# Patient Record
Sex: MALE | Race: WHITE | NOT HISPANIC OR LATINO | URBAN - METROPOLITAN AREA
[De-identification: names, ages, dates, MRNs, and addresses within clinical notes are randomized per-mention and may not be internally consistent; named-entity substitution may affect disease eponyms.]

---

## 2019-02-16 ENCOUNTER — EMERGENCY (EMERGENCY)
Facility: HOSPITAL | Age: 24
LOS: 0 days | Discharge: HOME | End: 2019-02-16
Attending: EMERGENCY MEDICINE | Admitting: EMERGENCY MEDICINE
Payer: COMMERCIAL

## 2019-02-16 VITALS — HEIGHT: 72 IN | WEIGHT: 166.89 LBS

## 2019-02-16 VITALS
SYSTOLIC BLOOD PRESSURE: 129 MMHG | RESPIRATION RATE: 16 BRPM | DIASTOLIC BLOOD PRESSURE: 74 MMHG | OXYGEN SATURATION: 100 % | HEART RATE: 63 BPM | TEMPERATURE: 99 F
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
## 2019-02-16 DIAGNOSIS — S43.402A UNSPECIFIED SPRAIN OF LEFT SHOULDER JOINT, INITIAL ENCOUNTER: ICD-10-CM
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
## 2019-02-16 DIAGNOSIS — S09.90XA UNSPECIFIED INJURY OF HEAD, INITIAL ENCOUNTER: ICD-10-CM
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
## 2019-02-16 DIAGNOSIS — S06.0X0A CONCUSSION WITHOUT LOSS OF CONSCIOUSNESS, INITIAL ENCOUNTER: ICD-10-CM

## 2019-02-16 DIAGNOSIS — S00.81XA ABRASION OF OTHER PART OF HEAD, INITIAL ENCOUNTER: ICD-10-CM

## 2019-02-16 PROCEDURE — 70450 CT HEAD/BRAIN W/O DYE: CPT | Mod: 26

## 2019-02-16 PROCEDURE — 99284 EMERGENCY DEPT VISIT MOD MDM: CPT

## 2019-02-16 PROCEDURE — 72125 CT NECK SPINE W/O DYE: CPT | Mod: 26

## 2019-02-16 PROCEDURE — 73030 X-RAY EXAM OF SHOULDER: CPT | Mod: 26,LT

## 2019-02-16 RX ORDER — TETANUS TOXOID, REDUCED DIPHTHERIA TOXOID AND ACELLULAR PERTUSSIS VACCINE, ADSORBED 5; 2.5; 8; 8; 2.5 [IU]/.5ML; [IU]/.5ML; UG/.5ML; UG/.5ML; UG/.5ML
0.5 SUSPENSION INTRAMUSCULAR ONCE
Qty: 0 | Refills: 0 | Status: COMPLETED | OUTPATIENT
Start: 2019-02-16 | End: 2019-02-16

## 2019-02-16 RX ORDER — ACETAMINOPHEN 500 MG
650 TABLET ORAL ONCE
Qty: 0 | Refills: 0 | Status: COMPLETED | OUTPATIENT
Start: 2019-02-16 | End: 2019-02-16

## 2019-02-16 RX ADMIN — TETANUS TOXOID, REDUCED DIPHTHERIA TOXOID AND ACELLULAR PERTUSSIS VACCINE, ADSORBED 0.5 MILLILITER(S): 5; 2.5; 8; 8; 2.5 SUSPENSION INTRAMUSCULAR at 20:44

## 2019-02-16 RX ADMIN — Medication 650 MILLIGRAM(S): at 20:44

## 2019-02-16 NOTE — ED STATDOCS - CLINICAL SUMMARY MEDICAL DECISION MAKING FREE TEXT BOX
No acute injuries on scans.  Pt with likely concussion.  Okay for d/c home, f/u concussion clinic.  Concussion precautions given.

## 2019-02-16 NOTE — ED STATDOCS - OBJECTIVE STATEMENT
24 y/o male with a PMHx of  presents to the ED c/o head injury this afternoon. Pt states he was working when a piece of sheet rock fell on the top of his head from 25 feet above him. Pt had a hard-hat on, but does not remember getting hit in the head. +LOC. No traumatic head injury. Pt went to an  for evaluation of HA before presenting to ED. He now feels sleepy and is concerned about head injury. Currently c/o left shoulder pain. Denies HA now. Denies CP.

## 2019-02-16 NOTE — ED STATDOCS - CARE PLAN
Principal Discharge DX:	Concussion  Secondary Diagnosis:	Shoulder sprain  Secondary Diagnosis:	Facial abrasion

## 2019-02-16 NOTE — ED STATDOCS - PROGRESS NOTE DETAILS
23 yr. 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 + LOC. 23 yr. 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 + LOC. Reports he did have his hard hat on at the time but doesn't remember what happened. Seen and examined by attending in Intake. Paln:[facility]uanito X-ray and CT Head /Neck. Will f/u with results and re evaluate. Vivienne NP Results of CT Head and Cervical spine and shoulder X-ray discussed and  reported to patient.  Vivinene QUIGLEY Concussion card given to patient and instructed to call number on card for evaluation. Vivienne QUIGLEY

## 2019-02-16 NOTE — ED ADULT TRIAGE NOTE - CHIEF COMPLAINT QUOTE
patient present with head injury after sheet rock fell on his head/face/shoulder. reports LOC, states he doesn't remember it hitting him but came to with people standing around him.

## 2021-04-25 NOTE — ED ADULT NURSE NOTE - CHIEF COMPLAINT QUOTE
patient present with head injury after sheet rock fell on his head/face/shoulder. reports LOC, states he doesn't remember it hitting him but came to with people standing around him. DISCHARGE

## 2024-06-27 NOTE — ED ADULT NURSE NOTE - NS ED PATIENT SAFETY CONCERN
Chronic low sodium for pat 10 years, ranging 130-134 usually   No workup  needed   
Left detailed message on secure VM for Paulina Jimenez   
Paulina called from [facility] and stated that the pt has been     hospitalized for hip surgery, they found that the pts has low sodium. Paulina would     like to be contacted to discuss possible labs being done regarding the pt. Paulina can    be contacted at [phone].  
Please advise   
No

## 2024-08-09 NOTE — ED ADULT NURSE NOTE - CHPI ED NUR SEVERITY2
Arterial Line:    Date/Time: 8/9/2024 8:30 AM    Staffing  Performed: [facility]   Authorized by: Maciej Bailey MD    Performed by: Maciej Bailey MD    An arterial line was placed. Procedure performed using ultrasound guidance.in the OR for the following indication(s): continuous blood pressure monitoring and blood sampling needed.    A 20 gauge (size), 1 and 3/4 inch (length), Angiocath (type) catheter was placed into the Left radial artery, secured by Tegaderm,   Seldinger technique used.  Events:  patient tolerated procedure well with no complications.             PAIN SCALE 0 OF 10.